# Patient Record
Sex: FEMALE
[De-identification: names, ages, dates, MRNs, and addresses within clinical notes are randomized per-mention and may not be internally consistent; named-entity substitution may affect disease eponyms.]

---

## 2017-09-12 PROBLEM — Z00.00 ENCOUNTER FOR PREVENTIVE HEALTH EXAMINATION: Status: ACTIVE | Noted: 2017-09-12

## 2017-09-25 ENCOUNTER — APPOINTMENT (OUTPATIENT)
Dept: NEUROSURGERY | Facility: CLINIC | Age: 52
End: 2017-09-25
Payer: COMMERCIAL

## 2017-09-25 VITALS
HEIGHT: 62 IN | BODY MASS INDEX: 29.44 KG/M2 | SYSTOLIC BLOOD PRESSURE: 110 MMHG | OXYGEN SATURATION: 96 % | WEIGHT: 160 LBS | TEMPERATURE: 98.3 F | HEART RATE: 73 BPM | DIASTOLIC BLOOD PRESSURE: 74 MMHG

## 2017-09-25 DIAGNOSIS — Z85.820 PERSONAL HISTORY OF MALIGNANT MELANOMA OF SKIN: ICD-10-CM

## 2017-09-25 DIAGNOSIS — R51 HEADACHE: ICD-10-CM

## 2017-09-25 PROCEDURE — 99244 OFF/OP CNSLTJ NEW/EST MOD 40: CPT

## 2017-10-04 ENCOUNTER — CLINICAL ADVICE (OUTPATIENT)
Age: 52
End: 2017-10-04

## 2017-10-09 ENCOUNTER — FORM ENCOUNTER (OUTPATIENT)
Age: 52
End: 2017-10-09

## 2017-10-09 ENCOUNTER — OUTPATIENT (OUTPATIENT)
Dept: OUTPATIENT SERVICES | Facility: HOSPITAL | Age: 52
LOS: 1 days | End: 2017-10-09
Payer: COMMERCIAL

## 2017-10-09 DIAGNOSIS — Q28.2 ARTERIOVENOUS MALFORMATION OF CEREBRAL VESSELS: ICD-10-CM

## 2017-10-10 ENCOUNTER — OUTPATIENT (OUTPATIENT)
Dept: OUTPATIENT SERVICES | Facility: HOSPITAL | Age: 52
LOS: 1 days | End: 2017-10-10
Payer: COMMERCIAL

## 2017-10-10 DIAGNOSIS — Q28.2 ARTERIOVENOUS MALFORMATION OF CEREBRAL VESSELS: ICD-10-CM

## 2017-10-10 LAB
ANION GAP SERPL CALC-SCNC: 9 MMOL/L — SIGNIFICANT CHANGE UP (ref 5–17)
BASOPHILS # BLD AUTO: 0 K/UL — SIGNIFICANT CHANGE UP (ref 0–0.2)
BASOPHILS NFR BLD AUTO: 0.6 % — SIGNIFICANT CHANGE UP (ref 0–2)
BUN SERPL-MCNC: 12 MG/DL — SIGNIFICANT CHANGE UP (ref 7–23)
CALCIUM SERPL-MCNC: 9 MG/DL — SIGNIFICANT CHANGE UP (ref 8.4–10.5)
CHLORIDE SERPL-SCNC: 105 MMOL/L — SIGNIFICANT CHANGE UP (ref 96–108)
CO2 SERPL-SCNC: 29 MMOL/L — SIGNIFICANT CHANGE UP (ref 22–31)
CREAT SERPL-MCNC: 0.72 MG/DL — SIGNIFICANT CHANGE UP (ref 0.5–1.3)
EOSINOPHIL # BLD AUTO: 0.1 K/UL — SIGNIFICANT CHANGE UP (ref 0–0.5)
EOSINOPHIL NFR BLD AUTO: 1.1 % — SIGNIFICANT CHANGE UP (ref 0–6)
GLUCOSE SERPL-MCNC: 116 MG/DL — HIGH (ref 70–99)
HCT VFR BLD CALC: 41.6 % — SIGNIFICANT CHANGE UP (ref 34.5–45)
HGB BLD-MCNC: 14 G/DL — SIGNIFICANT CHANGE UP (ref 11.5–15.5)
LYMPHOCYTES # BLD AUTO: 1.8 K/UL — SIGNIFICANT CHANGE UP (ref 1–3.3)
LYMPHOCYTES # BLD AUTO: 31.8 % — SIGNIFICANT CHANGE UP (ref 13–44)
MCHC RBC-ENTMCNC: 31.2 PG — SIGNIFICANT CHANGE UP (ref 27–34)
MCHC RBC-ENTMCNC: 33.7 GM/DL — SIGNIFICANT CHANGE UP (ref 32–36)
MCV RBC AUTO: 92.6 FL — SIGNIFICANT CHANGE UP (ref 80–100)
MONOCYTES # BLD AUTO: 0.5 K/UL — SIGNIFICANT CHANGE UP (ref 0–0.9)
MONOCYTES NFR BLD AUTO: 9.7 % — SIGNIFICANT CHANGE UP (ref 2–14)
NEUTROPHILS # BLD AUTO: 3.1 K/UL — SIGNIFICANT CHANGE UP (ref 1.8–7.4)
NEUTROPHILS NFR BLD AUTO: 56.8 % — SIGNIFICANT CHANGE UP (ref 43–77)
PLATELET # BLD AUTO: 185 K/UL — SIGNIFICANT CHANGE UP (ref 150–400)
POTASSIUM SERPL-MCNC: 4 MMOL/L — SIGNIFICANT CHANGE UP (ref 3.5–5.3)
POTASSIUM SERPL-SCNC: 4 MMOL/L — SIGNIFICANT CHANGE UP (ref 3.5–5.3)
RBC # BLD: 4.49 M/UL — SIGNIFICANT CHANGE UP (ref 3.8–5.2)
RBC # FLD: 10.9 % — SIGNIFICANT CHANGE UP (ref 10.3–14.5)
SODIUM SERPL-SCNC: 143 MMOL/L — SIGNIFICANT CHANGE UP (ref 135–145)
WBC # BLD: 5.5 K/UL — SIGNIFICANT CHANGE UP (ref 3.8–10.5)
WBC # FLD AUTO: 5.5 K/UL — SIGNIFICANT CHANGE UP (ref 3.8–10.5)

## 2017-10-10 PROCEDURE — 36224 PLACE CATH CAROTD ART: CPT | Mod: RT

## 2017-10-10 PROCEDURE — 36223 PLACE CATH CAROTID/INOM ART: CPT | Mod: 59,LT

## 2017-10-10 PROCEDURE — 36226 PLACE CATH VERTEBRAL ART: CPT | Mod: 50

## 2017-10-10 PROCEDURE — 36227 PLACE CATH XTRNL CAROTID: CPT | Mod: 50

## 2017-10-10 RX ORDER — SODIUM CHLORIDE 9 MG/ML
1000 INJECTION INTRAMUSCULAR; INTRAVENOUS; SUBCUTANEOUS
Refills: 0 | Status: DISCONTINUED | OUTPATIENT
Start: 2017-10-10 | End: 2017-10-25

## 2017-10-10 NOTE — CHART NOTE - NSCHARTNOTEFT_GEN_A_CORE
Interventional Neuro- Radiology   Procedure Note PA-C    Procedure: Selective Cerebral Angiography   Pre- Procedure Diagnosis:  AVM  Post- Procedure Diagnosis:    : Dr. Remberto Kay   Fellow:       Dr Mariza Lopez    Physician Assistant: Patsy Mooney PA-C  Nurse:                    Viji Dorantes RN  Radiologic Tech:  Anesthesiologist:     Dr Robby Purcell  Sheath:    I/Os:  Estimated blood loss less than 10cc  IV fluids:     cc     Urine output     cc      Contrast Omnipaque 240      cc             Vitals: BP         HR      Spo2       Preliminary Report:  Using a 4 Turkish short/long sheath to the right groin under MAC sedation via left vertebral artery,  left common carotid artery, left external carotid artery, right vertebral artery,  right common carotid artery, right external carotid artery a selective cerebral angiography was performed and demonstrated               Official note to follow.  Patient tolerated procedure well, hemodynamically stable, no change in neurological status compared to baseline.  Results discussed with neurosurgery, patient and patient's family.  Groin sheath was removed, manual compression held to hemostasis  for  21 minutes, no active bleeding, no hematoma, Avitene applied, quick clot and safeguard balloon dressing applied at _____h.  STAT labs: CBC BMP  ____h.  Patient transferred to Recovery Room Interventional Neuro- Radiology   Procedure Note PA-C    Procedure: Selective Cerebral Angiography   Pre- Procedure Diagnosis:  AVM  Post- Procedure Diagnosis:    : Dr. Remberto Kay   Fellow:       Dr Mariza Lopez    Physician Assistant: Patsy Mooney PA-C  Nurse:                    Viji Dorantes RN  Radiologic Tech:      Vandana Fajardo LRT  Anesthesiologist:     Dr Robby Purcell  Sheath:  Com  I/Os:  Estimated blood loss less than 10cc  IV fluids:     cc     Urine output     cc      Contrast Omnipaque 240      cc             Vitals: /77    HR 70      Spo2 97%      Preliminary Report:  Using a 4 Turkish short/long sheath to the right groin under MAC sedation via left vertebral artery,  left common carotid artery, left external carotid artery, right vertebral artery,  right common carotid artery, right external carotid artery a selective cerebral angiography was performed and demonstrated                 Official note to follow.  Patient tolerated procedure well, hemodynamically stable, no change in neurological status compared to baseline.  Results discussed with neurosurgery, patient and patient's family.  Groin sheath was removed, manual compression held to hemostasis  for  21 minutes, no active bleeding, no hematoma, Avitene applied, quick clot and safeguard balloon dressing applied at _____h.  STAT labs: CBC BMP  ____h.  Patient transferred to Recovery Room Interventional Neuro- Radiology   Procedure Note PA-C    Procedure: Selective Cerebral Angiography   Pre- Procedure Diagnosis:  AVM  Post- Procedure Diagnosis: no AVM, No aneurysm    : Dr. Remberto Kay   Fellow:       Dr Mariza Lopez    Physician Assistant: Patsy Mooney PA-C  Nurse:                    Viji Dorantes RN  Radiologic Tech:      Vandana Fajardo LRT  Anesthesiologist:     Dr Robby Purcell  Sheath:                    4 Monegasque long sheath   Com  I/Os:  Estimated blood loss less than 10cc  IV fluids:100cc     Urine output due to void   Contrast Omnipaque 240  112cc             Vitals: /77    HR 70      Spo2 97%      Preliminary Report:  Using a 4 Monegasque long sheath to the right groin under MAC sedation via left vertebral artery, left common carotid artery, left external carotid artery, right vertebral artery, right common carotid artery, right external carotid artery, right internal carotid artery a selective cerebral angiography was performed and demonstrated Normal pial and dural surfaces. No evidence of aneurysm or vascular malformation. A large right frontal superficial venous anomaly, filling in normal segments, was visualized. No evidence of venous occlusion. Official note to follow.  Patient tolerated procedure well, hemodynamically stable, no change in neurological status compared to baseline.  Results discussed with neurosurgery, patient and patient's family.  Groin sheath was removed, manual compression held to hemostasis  for  21 minutes, no active bleeding, no hematoma, Avitene applied, quick clot and safeguard balloon dressing applied at 9:45am  STAT labs: CBC BMP 1400 hours  Patient transferred to Recovery Room 1st floor Interventional Neuro- Radiology   Procedure Note PA-C    Procedure: Selective Cerebral Angiography   Pre- Procedure Diagnosis:  AVM  Post- Procedure Diagnosis: no AVM, No aneurysm    : Dr. Remberto Kay   Fellow:       Dr Mariza Lopez    Physician Assistant: Patsy Mooney PA-C  Nurse:                    Viji Dorantes RN  Radiologic Tech:      Vandana Fajardo LRT  Anesthesiologist:     Dr Robby Purcell  Sheath:                    4 Hungarian long sheath   Complications:          none  I/Os:  Estimated blood loss less than 10cc  IV fluids:100cc   Urine output due to void   Contrast Omnipaque 240  112cc           Vitals: /77    HR 70    Spo2 97%      Preliminary Report:  Using a 4 Hungarian long sheath to the right groin under MAC sedation via left vertebral artery, left common carotid artery, left external carotid artery, right vertebral artery, right common carotid artery, right external carotid artery, right internal carotid artery a selective cerebral angiography was performed and demonstrated Normal pial and dural surfaces. No evidence of aneurysm or vascular malformation. A large right frontal superficial venous anomaly, filling in normal segments, was visualized. No evidence of venous occlusion. Official note to follow.  Patient tolerated procedure well, hemodynamically stable, no change in neurological status compared to baseline.  Results discussed with neurosurgery, patient and patient's family.  Groin sheath was removed, manual compression held to hemostasis for 21 minutes, no active bleeding, no hematoma, Avitene applied, quick clot and safeguard balloon dressing applied at 9:45am  STAT labs: CBC BMP 1400 hours  Patient transferred to Recovery Room 1st floor

## 2017-10-10 NOTE — CHART NOTE - NSCHARTNOTEFT_GEN_A_CORE
Interventional Neuro Radiology  Pre-Procedure Note PA-C    This is a 52 year old right hand dominant female           Allergies: adhesives (Other)  Drug Allergies Not Recorded  PMHX: AVM, Bartholin cyst , fibroid, melanoma, breast cyst, headache  Bartholin cyst: &#x27; 2005  Excised  PSHX: Status post hysterectomy, hx    Social History:  non-smoker   FAMILY HISTORY:    CBC                        14.7   4.35  )-----------( 233                  45.1     BMP    139  |  102  |  13  ----------------------------<  91  4.2   |  28  |  0.68      Blood Bank: AB positive           Assessment/Plan:   This is a 52 year old right  hand dominant female presents with   Procedure, goals, risks, benefits and alternatives  were discussed with patient and patient's family. All questions were answered. Risks include but are not limited to stroke, vessel injury, hemorrhage, and or right  groin hematoma. Patient demonstrates understanding of all risks involved with this procedure and wishes to continue.  Appropriate  content was obtained from patient and consent is in the patient's chart. Interventional Neuro Radiology  Pre-Procedure Note PA-C    This is a 52 year old right hand dominant female who sought her primary care physician for with complaints of headache and imaging revealed an incidental finding of an AVM. Patient presents to Neuro IR for a selective cerebral angiography to study cerebral vessels. Patient is A+O 3 speech is fluent, recent memory intact, moves all extremities.     Allergies: adhesives  PMHX: AVM, Bartholin cyst 2005, fibroid, melanoma, breast cyst, headache  PSHX: Status post hysterectomy, hx    Social History:   non-smoker   FAMILY HISTORY: non-contributory   CBC                        14.7   4.35  )-----------( 233                  45.1     BMP    139  |  102  |  13  ----------------------------<  91  4.2   |  28  |  0.68    Blood Bank: AB positive available     Assessment/Plan:   This is a 52 year old right hand dominant female presents with an Mri of the brain which was concerning for AVM. Patient is here for a DX cerebral angiography to study vessels.  Procedure, goals, risks, benefits and alternatives  were discussed with patient and patient's family. All questions were answered. Risks include but are not limited to stroke, vessel injury, hemorrhage, and or right  groin hematoma. Patient demonstrates understanding of all risks involved with this procedure and wishes to continue. Appropriate content was obtained from patient and consent is in the patient's chart.

## 2017-10-11 ENCOUNTER — OTHER (OUTPATIENT)
Age: 52
End: 2017-10-11

## 2017-10-25 DIAGNOSIS — Q28.3 OTHER MALFORMATIONS OF CEREBRAL VESSELS: ICD-10-CM

## 2018-05-17 ENCOUNTER — APPOINTMENT (OUTPATIENT)
Dept: SURGICAL ONCOLOGY | Facility: CLINIC | Age: 53
End: 2018-05-17
Payer: COMMERCIAL

## 2018-05-17 VITALS
WEIGHT: 160 LBS | SYSTOLIC BLOOD PRESSURE: 120 MMHG | HEART RATE: 69 BPM | BODY MASS INDEX: 29.44 KG/M2 | RESPIRATION RATE: 15 BRPM | HEIGHT: 62 IN | DIASTOLIC BLOOD PRESSURE: 81 MMHG

## 2018-05-17 DIAGNOSIS — Z80.3 FAMILY HISTORY OF MALIGNANT NEOPLASM OF BREAST: ICD-10-CM

## 2018-05-17 DIAGNOSIS — Z85.828 PERSONAL HISTORY OF OTHER MALIGNANT NEOPLASM OF SKIN: ICD-10-CM

## 2018-05-17 PROCEDURE — 99243 OFF/OP CNSLTJ NEW/EST LOW 30: CPT

## 2018-05-17 RX ORDER — CALCIUM CITRATE/VITAMIN D3 200MG-6.25
TABLET ORAL
Refills: 0 | Status: ACTIVE | COMMUNITY

## 2018-05-17 RX ORDER — CHROMIUM 200 MCG
1000 TABLET ORAL
Refills: 0 | Status: DISCONTINUED | COMMUNITY
End: 2018-05-17

## 2018-05-17 RX ORDER — MULTIVITAMIN
TABLET ORAL
Refills: 0 | Status: DISCONTINUED | COMMUNITY
End: 2018-05-17

## 2018-05-30 ENCOUNTER — APPOINTMENT (OUTPATIENT)
Dept: OPHTHALMOLOGY | Facility: CLINIC | Age: 53
End: 2018-05-30
Payer: COMMERCIAL

## 2018-05-30 DIAGNOSIS — H04.129 DRY EYE SYNDROME OF UNSPECIFIED LACRIMAL GLAND: ICD-10-CM

## 2018-05-30 PROCEDURE — 92004 COMPRE OPH EXAM NEW PT 1/>: CPT

## 2018-10-12 ENCOUNTER — OUTPATIENT (OUTPATIENT)
Dept: OUTPATIENT SERVICES | Facility: HOSPITAL | Age: 53
LOS: 1 days | End: 2018-10-12
Payer: COMMERCIAL

## 2018-10-12 VITALS
HEART RATE: 72 BPM | OXYGEN SATURATION: 99 % | TEMPERATURE: 97 F | RESPIRATION RATE: 20 BRPM | SYSTOLIC BLOOD PRESSURE: 117 MMHG | HEIGHT: 62 IN | WEIGHT: 164.02 LBS | DIASTOLIC BLOOD PRESSURE: 78 MMHG

## 2018-10-12 DIAGNOSIS — Z01.818 ENCOUNTER FOR OTHER PREPROCEDURAL EXAMINATION: ICD-10-CM

## 2018-10-12 DIAGNOSIS — Z90.711 ACQUIRED ABSENCE OF UTERUS WITH REMAINING CERVICAL STUMP: Chronic | ICD-10-CM

## 2018-10-12 DIAGNOSIS — Z98.891 HISTORY OF UTERINE SCAR FROM PREVIOUS SURGERY: Chronic | ICD-10-CM

## 2018-10-12 DIAGNOSIS — N75.0 CYST OF BARTHOLIN'S GLAND: Chronic | ICD-10-CM

## 2018-10-12 DIAGNOSIS — D24.1 BENIGN NEOPLASM OF RIGHT BREAST: ICD-10-CM

## 2018-10-12 DIAGNOSIS — D36.9 BENIGN NEOPLASM, UNSPECIFIED SITE: ICD-10-CM

## 2018-10-12 LAB
HCT VFR BLD CALC: 44 % — SIGNIFICANT CHANGE UP (ref 34.5–45)
HGB BLD-MCNC: 13.9 G/DL — SIGNIFICANT CHANGE UP (ref 11.5–15.5)
MCHC RBC-ENTMCNC: 29.4 PG — SIGNIFICANT CHANGE UP (ref 27–34)
MCHC RBC-ENTMCNC: 31.6 GM/DL — LOW (ref 32–36)
MCV RBC AUTO: 93 FL — SIGNIFICANT CHANGE UP (ref 80–100)
PLATELET # BLD AUTO: 244 K/UL — SIGNIFICANT CHANGE UP (ref 150–400)
RBC # BLD: 4.73 M/UL — SIGNIFICANT CHANGE UP (ref 3.8–5.2)
RBC # FLD: 12.8 % — SIGNIFICANT CHANGE UP (ref 10.3–14.5)
WBC # BLD: 5.87 K/UL — SIGNIFICANT CHANGE UP (ref 3.8–10.5)
WBC # FLD AUTO: 5.87 K/UL — SIGNIFICANT CHANGE UP (ref 3.8–10.5)

## 2018-10-12 RX ORDER — CELECOXIB 200 MG/1
200 CAPSULE ORAL ONCE
Refills: 0 | Status: COMPLETED | OUTPATIENT
Start: 2018-10-18 | End: 2018-10-18

## 2018-10-12 RX ORDER — SODIUM CHLORIDE 9 MG/ML
3 INJECTION INTRAMUSCULAR; INTRAVENOUS; SUBCUTANEOUS EVERY 8 HOURS
Refills: 0 | Status: DISCONTINUED | OUTPATIENT
Start: 2018-10-18 | End: 2018-11-02

## 2018-10-12 RX ORDER — LIDOCAINE HCL 20 MG/ML
0.2 VIAL (ML) INJECTION ONCE
Refills: 0 | Status: DISCONTINUED | OUTPATIENT
Start: 2018-10-18 | End: 2018-11-02

## 2018-10-12 RX ORDER — ACETAMINOPHEN 500 MG
1000 TABLET ORAL ONCE
Refills: 0 | Status: COMPLETED | OUTPATIENT
Start: 2018-10-18 | End: 2018-10-18

## 2018-10-12 NOTE — H&P PST ADULT - EYES
Thank You    Thank you for choosing the PULMONARY MEDICINE DEPARTMENT at [facility]. At [facility], your satisfaction is our priority. You may receive a survey asking about your experience with us. We would appreciate you taking the time to complete and return the survey. Our goal is to provide you with a level 5 or Very Good experience. We thank you for allowing us to serve you and value your feedback.    Your Nurse/Medical Assistant: ___Benjamin Singletary___________________________    Sincerely,  Pulmonary Department  Phone: [phone]  Fax: [phone]         
EOMI; PERRL; no drainage or redness

## 2018-10-12 NOTE — H&P PST ADULT - PSH
Bartholin cyst  '   Excised  Breast cyst, unspecified laterality  age 20 -age 48   Bilateral. Excised. Benign  History of  section   and   Melanoma  '    Excised: Right Leg  Status post hysterectomy  '    JERED. Benign Bartholin cyst  excision   Breast cyst, unspecified laterality  age 20 -age 48   Bilateral. Excised. Benign  Melanoma  '    Excised: Right Leg  S/P  section  ,   S/P partial hysterectomy  for uterine fibroid

## 2018-10-12 NOTE — H&P PST ADULT - HISTORY OF PRESENT ILLNESS
53 year old female with history of multiple breast cysts , with recent mammo & MRI with Right breast biopsy - intraductal papilloma, Now coming in for Right breast lumpectomy, Post marlyn  reflector placement on 10/18/18.

## 2018-10-12 NOTE — H&P PST ADULT - NSANTHOSAYNRD_GEN_A_CORE
No. EDDIE screening performed.  STOP BANG Legend: 0-2 = LOW Risk; 3-4 = INTERMEDIATE Risk; 5-8 = HIGH Risk

## 2018-10-12 NOTE — H&P PST ADULT - PMH
Bartholin cyst  ' 2005  Breast cyst, unspecified laterality  age 20-age 48   Bilateral. Excised. Benign  Fibroid, uterine  ' 2012    surgically treated  Headache  due to hair product in 2016  Melanoma  '09   Right Leg. surgically treated Breast cyst, unspecified laterality  age 20-age 48   Bilateral. Excised. Benign  Headache  due to hair product in 2016  Intraductal papilloma of breast, right    Melanoma  '09   Right Leg. surgically treated

## 2018-10-17 ENCOUNTER — OUTPATIENT (OUTPATIENT)
Dept: OUTPATIENT SERVICES | Facility: HOSPITAL | Age: 53
LOS: 1 days | End: 2018-10-17

## 2018-10-17 ENCOUNTER — APPOINTMENT (OUTPATIENT)
Dept: MAMMOGRAPHY | Facility: IMAGING CENTER | Age: 53
End: 2018-10-17

## 2018-10-17 ENCOUNTER — FORM ENCOUNTER (OUTPATIENT)
Age: 53
End: 2018-10-17

## 2018-10-17 ENCOUNTER — TRANSCRIPTION ENCOUNTER (OUTPATIENT)
Age: 53
End: 2018-10-17

## 2018-10-17 DIAGNOSIS — Z90.711 ACQUIRED ABSENCE OF UTERUS WITH REMAINING CERVICAL STUMP: Chronic | ICD-10-CM

## 2018-10-17 DIAGNOSIS — R92.8 OTHER ABNORMAL AND INCONCLUSIVE FINDINGS ON DIAGNOSTIC IMAGING OF BREAST: ICD-10-CM

## 2018-10-17 DIAGNOSIS — N75.0 CYST OF BARTHOLIN'S GLAND: Chronic | ICD-10-CM

## 2018-10-17 DIAGNOSIS — Z98.891 HISTORY OF UTERINE SCAR FROM PREVIOUS SURGERY: Chronic | ICD-10-CM

## 2018-10-18 ENCOUNTER — RESULT REVIEW (OUTPATIENT)
Age: 53
End: 2018-10-18

## 2018-10-18 ENCOUNTER — APPOINTMENT (OUTPATIENT)
Dept: MAMMOGRAPHY | Facility: IMAGING CENTER | Age: 53
End: 2018-10-18
Payer: COMMERCIAL

## 2018-10-18 ENCOUNTER — APPOINTMENT (OUTPATIENT)
Dept: SURGICAL ONCOLOGY | Facility: HOSPITAL | Age: 53
End: 2018-10-18

## 2018-10-18 ENCOUNTER — OUTPATIENT (OUTPATIENT)
Dept: OUTPATIENT SERVICES | Facility: HOSPITAL | Age: 53
LOS: 1 days | End: 2018-10-18
Payer: COMMERCIAL

## 2018-10-18 VITALS
HEIGHT: 62 IN | RESPIRATION RATE: 20 BRPM | OXYGEN SATURATION: 100 % | HEART RATE: 73 BPM | SYSTOLIC BLOOD PRESSURE: 117 MMHG | DIASTOLIC BLOOD PRESSURE: 78 MMHG | WEIGHT: 164.02 LBS | TEMPERATURE: 98 F

## 2018-10-18 VITALS
RESPIRATION RATE: 18 BRPM | OXYGEN SATURATION: 100 % | TEMPERATURE: 98 F | HEART RATE: 64 BPM | DIASTOLIC BLOOD PRESSURE: 72 MMHG | SYSTOLIC BLOOD PRESSURE: 106 MMHG

## 2018-10-18 DIAGNOSIS — R92.8 OTHER ABNORMAL AND INCONCLUSIVE FINDINGS ON DIAGNOSTIC IMAGING OF BREAST: ICD-10-CM

## 2018-10-18 DIAGNOSIS — Z98.891 HISTORY OF UTERINE SCAR FROM PREVIOUS SURGERY: Chronic | ICD-10-CM

## 2018-10-18 DIAGNOSIS — N75.0 CYST OF BARTHOLIN'S GLAND: Chronic | ICD-10-CM

## 2018-10-18 DIAGNOSIS — Z90.711 ACQUIRED ABSENCE OF UTERUS WITH REMAINING CERVICAL STUMP: Chronic | ICD-10-CM

## 2018-10-18 DIAGNOSIS — D36.9 BENIGN NEOPLASM, UNSPECIFIED SITE: ICD-10-CM

## 2018-10-18 PROCEDURE — 19125 EXCISION BREAST LESION: CPT | Mod: RT

## 2018-10-18 PROCEDURE — 19281 PERQ DEVICE BREAST 1ST IMAG: CPT | Mod: RT

## 2018-10-18 PROCEDURE — 88305 TISSUE EXAM BY PATHOLOGIST: CPT | Mod: 26

## 2018-10-18 PROCEDURE — 19281 PERQ DEVICE BREAST 1ST IMAG: CPT | Mod: 59

## 2018-10-18 PROCEDURE — 76098 X-RAY EXAM SURGICAL SPECIMEN: CPT | Mod: 26

## 2018-10-18 RX ORDER — ONDANSETRON 8 MG/1
4 TABLET, FILM COATED ORAL ONCE
Refills: 0 | Status: DISCONTINUED | OUTPATIENT
Start: 2018-10-18 | End: 2018-11-02

## 2018-10-18 RX ORDER — CELECOXIB 200 MG/1
200 CAPSULE ORAL ONCE
Refills: 0 | Status: COMPLETED | OUTPATIENT
Start: 2018-10-18 | End: 2018-10-18

## 2018-10-18 RX ORDER — OXYCODONE HYDROCHLORIDE 5 MG/1
5 TABLET ORAL ONCE
Refills: 0 | Status: DISCONTINUED | OUTPATIENT
Start: 2018-10-18 | End: 2018-10-18

## 2018-10-18 RX ORDER — SODIUM CHLORIDE 9 MG/ML
1000 INJECTION, SOLUTION INTRAVENOUS
Refills: 0 | Status: DISCONTINUED | OUTPATIENT
Start: 2018-10-18 | End: 2018-11-02

## 2018-10-18 RX ORDER — HYDROMORPHONE HYDROCHLORIDE 2 MG/ML
0.25 INJECTION INTRAMUSCULAR; INTRAVENOUS; SUBCUTANEOUS
Refills: 0 | Status: DISCONTINUED | OUTPATIENT
Start: 2018-10-18 | End: 2018-10-18

## 2018-10-18 RX ADMIN — CELECOXIB 200 MILLIGRAM(S): 200 CAPSULE ORAL at 14:05

## 2018-10-18 RX ADMIN — CELECOXIB 200 MILLIGRAM(S): 200 CAPSULE ORAL at 11:29

## 2018-10-18 RX ADMIN — SODIUM CHLORIDE 3 MILLILITER(S): 9 INJECTION INTRAMUSCULAR; INTRAVENOUS; SUBCUTANEOUS at 11:30

## 2018-10-18 RX ADMIN — Medication 1000 MILLIGRAM(S): at 11:28

## 2018-10-18 NOTE — ASU PATIENT PROFILE, ADULT - PSH
Bartholin cyst  excision   Breast cyst, unspecified laterality  age 20 -age 48   Bilateral. Excised. Benign  Melanoma  '    Excised: Right Leg  S/P  section  ,   S/P partial hysterectomy  for uterine fibroid

## 2018-10-18 NOTE — ASU PATIENT PROFILE, ADULT - VISION (WITH CORRECTIVE LENSES IF THE PATIENT USUALLY WEARS THEM):
glasses/Normal vision: sees adequately in most situations; can see medication labels, newsprint glasses/Partially impaired: cannot see medication labels or newsprint, but can see obstacles in path, and the surrounding layout; can count fingers at arm's length

## 2018-10-18 NOTE — ASU PATIENT PROFILE, ADULT - PMH
Breast cyst, unspecified laterality  age 20-age 48   Bilateral. Excised. Benign  Headache  due to hair product in 2016  Intraductal papilloma of breast, right    Melanoma  '09   Right Leg. surgically treated

## 2018-10-18 NOTE — BRIEF OPERATIVE NOTE - PROCEDURE
Lumpectomy of right breast after needle localization  10/18/2018    Active  MTRAN1 <<-----Click on this checkbox to enter Procedure

## 2018-10-18 NOTE — ASU DISCHARGE PLAN (ADULT/PEDIATRIC). - MEDICATION SUMMARY - MEDICATIONS TO TAKE
I will START or STAY ON the medications listed below when I get home from the hospital:    Citrucel 500 mg oral tablet  -- 2 tab(s) by mouth once a day  -- Indication: For home med    Vitamin D3 1000 intl units oral tablet  -- 1 tab(s) by mouth once a day  -- Indication: For home med

## 2018-10-24 LAB — SURGICAL PATHOLOGY STUDY: SIGNIFICANT CHANGE UP

## 2018-10-25 PROBLEM — Z00.00 ENCOUNTER FOR PREVENTIVE HEALTH EXAMINATION: Status: ACTIVE | Noted: 2018-10-25

## 2020-01-02 ENCOUNTER — APPOINTMENT (OUTPATIENT)
Dept: SURGICAL ONCOLOGY | Facility: CLINIC | Age: 55
End: 2020-01-02
Payer: COMMERCIAL

## 2020-01-02 VITALS
DIASTOLIC BLOOD PRESSURE: 80 MMHG | RESPIRATION RATE: 15 BRPM | SYSTOLIC BLOOD PRESSURE: 118 MMHG | WEIGHT: 165 LBS | HEART RATE: 72 BPM | BODY MASS INDEX: 30.36 KG/M2 | OXYGEN SATURATION: 96 % | HEIGHT: 62 IN

## 2020-01-02 DIAGNOSIS — D36.9 BENIGN NEOPLASM, UNSPECIFIED SITE: ICD-10-CM

## 2020-01-02 DIAGNOSIS — R92.8 OTHER ABNORMAL AND INCONCLUSIVE FINDINGS ON DIAGNOSTIC IMAGING OF BREAST: ICD-10-CM

## 2020-01-02 PROCEDURE — 99214 OFFICE O/P EST MOD 30 MIN: CPT

## 2020-01-02 NOTE — PHYSICAL EXAM
[Normal] : supple, no neck mass and thyroid not enlarged [Normal Supraclavicular Lymph Nodes] : normal supraclavicular lymph nodes [Normal Axillary Lymph Nodes] : normal axillary lymph nodes [Normal] : oriented to person, place and time, with appropriate affect [FreeTextEntry1] : RC present for exam.  [de-identified] : Normal S1, S2. Regular rate and rhythm. [de-identified] : Clear breath sounds bilaterally, normal respiratory effort.  [de-identified] : Complete breast exam performed in supine and upright positions. Grain sized nodule left breast 6:00.  No tenderness, nipple discharge, inversion, deviation or enlarged axillary or supraclavicular lymph nodes.

## 2020-01-02 NOTE — HISTORY OF PRESENT ILLNESS
[de-identified] : Ms. Schuler is a 54 year old female who presents today for a follow up exam.  She is s/p right breast lumpectomy Oct 2018 for an intraductal papilloma.\par \par Breast MRI 12/9/19: probably benign areas of enhancement in the breasts b/l for which 6 month follow up breast MRI is recommended for attention to right 9:00 and left upper outer quadrant and left 8-9:00.\par MMG/sono 11/14/19: Birads 2 for right breast.  Targeted left breast sono recommended for nodule left breast 4:00 (Birads 0).  Repeat sonogram 12/9/19 Birads 2 imaging.\par \par Today denies palpable breast masses, skin changes, inversion or breast pain. States that she experienced yellow discharge from right nipple during her mammogram in March.  Denies constitutional symptoms. \par \par Family hx of breast cancer in her sister at age 53 and maternal aunt late 60s. Denies family hx of ovarian cancer.  No genetic testing in herself or her sister.\par \par PMH otherwise significant for early stage melanoma Right lower leg.\par \par Internist: Dr. Joseph Henderson\par Gyn: Sal Lopresti

## 2020-01-02 NOTE — ASSESSMENT
[FreeTextEntry1] : Impression:\par No evidence of new or recurrent lesions - hx of intraductal papilloma right breast s/p lumpectomy 10/2018\par B/L breast enhancements on breast MRI (Birads 3).\par No suspicious lesions on exam.\par  \par \par Plan:\par RTO annually\par Breast MRI June 2020\par Mammo/sono Nov 2020\par

## 2020-10-06 NOTE — H&P PST ADULT - NSWEIGHTCALCTOOLDRUG_GEN_A_CORE
used Cyclophosphamide Counseling:  I discussed with the patient the risks of cyclophosphamide including but not limited to hair loss, hormonal abnormalities, decreased fertility, abdominal pain, diarrhea, nausea and vomiting, bone marrow suppression and infection. The patient understands that monitoring is required while taking this medication.

## 2023-11-08 NOTE — H&P PST ADULT - ADMIT DATE
10-Oct-2017 Preparation Of Recipient Site - Flap Takedown: The eschar and granulation tissue was removed surgically with sharp dissection to facilitate appropriate healing after division and inset of the proximal and distal interpolation flap.

## 2024-05-06 RX ORDER — METHYLCELLULOSE 500 MG
2 TABLET ORAL
Qty: 0 | Refills: 0 | DISCHARGE